# Patient Record
Sex: FEMALE | Race: ASIAN | NOT HISPANIC OR LATINO | URBAN - METROPOLITAN AREA
[De-identification: names, ages, dates, MRNs, and addresses within clinical notes are randomized per-mention and may not be internally consistent; named-entity substitution may affect disease eponyms.]

---

## 2023-05-01 VITALS
HEART RATE: 83 BPM | TEMPERATURE: 98 F | SYSTOLIC BLOOD PRESSURE: 104 MMHG | WEIGHT: 121.03 LBS | DIASTOLIC BLOOD PRESSURE: 60 MMHG | HEIGHT: 61 IN | OXYGEN SATURATION: 96 %

## 2023-05-01 RX ORDER — CHLORHEXIDINE GLUCONATE 213 G/1000ML
1 SOLUTION TOPICAL ONCE
Refills: 0 | Status: DISCONTINUED | OUTPATIENT
Start: 2023-05-05 | End: 2023-05-20

## 2023-05-01 NOTE — H&P ADULT - NSHPLABSRESULTS_GEN_ALL_CORE
12.1   10.30 )-----------( 298      ( 05 May 2023 13:02 )             36.5       05-05    136  |  100  |  16  ----------------------------<  144<H>  4.1   |  26  |  0.71    Ca    9.3      05 May 2023 13:02  Mg     2.2     05-05    TPro  7.5  /  Alb  4.4  /  TBili  0.2  /  DBili  x   /  AST  17  /  ALT  18  /  AlkPhos  52  05-05      PT/INR - ( 05 May 2023 13:02 )   PT: 11.5 sec;   INR: 0.97          PTT - ( 05 May 2023 13:02 )  PTT:31.4 sec    CARDIAC MARKERS ( 05 May 2023 13:02 )  x     / x     / 91 U/L / x     / 2.3 ng/mL            EKG: nonischemic

## 2023-05-01 NOTE — H&P ADULT - NSICDXPASTMEDICALHX_GEN_ALL_CORE_FT
PAST MEDICAL HISTORY:  ASD (atrial septal defect)     Hyperlipidemia     Hypertension     PVD (peripheral vascular disease)     Type 2 diabetes mellitus     VSD (ventricular septal defect)

## 2023-05-01 NOTE — H&P ADULT - ASSESSMENT
73 yo Cantonese speaking female with PMHx of HTN, Hyperlipidemia, DM Type II, PVD, Asthma, Pulmonary Nodules, VSD (5 mm in thickness involving inferior aspect of the interventricular septum on CCTA 4/26/23), ? ASD (TTE 3/7/23), who presents to cardiac cath in light of patient’s risk factors, CCS Angina Class III Symptoms and abnormal CCTA with significant stenosis LAD and RCA  with possible intervention if clinically indicated.      ASA III				Mallampati class: III    Sedation Plan:   [  ] None   [x  ] Moderate   [  ]  Deep    [  ]  General Anesthesia   Patient Is Suitable Candidate For Sedation?     [ x ] Yes   [  ] No   [  ] Not Applicable   Cath Order Entered: [x  ] Yes  DAPT LOAD Ordered: [  ] Yes  [x  ] No load 2/2 takes Aspirin 81mg daily, last dose this morning 5/5/2023. Pharmacy called to confirm meds and stated takes Plavix 75mg daily, however confirmed with pt and pt's daughter-in-law at bedside, she only takes Aspirin 81mg daily and does not take Plavix 75mg. H/H 12.1/36.5 stables and denies any bleeding.  Pre-Cath fluids Ordered: [ x ] Yes  EF 60% on echo, BUN/Cr 16/0.71, euvolemic on exam, start NS 250cc bolus x 1 follow by NS @ 75cc/hr x 2 hours  Pt refused Language Line  and wishes daughter-in-law at bedside to interpreted     Risks & benefits of procedure and sedation and risks and benefits for the alternative therapy have been explained to the patient and/or HCP in layman’s terms including but not limited to: allergic reaction, bleeding, infection, arrhythmia, respiratory compromise, renal and vascular compromise, limb damage, MI, CVA, emergent CABG/Vascular Surgery and death. Informed consent obtained and in chart.

## 2023-05-01 NOTE — H&P ADULT - TEMPERATURE IN CELSIUS (DEGREES C)
36.9 [Chest Pain] : chest pain [Palpitations] : no palpitations [Claudication] : no  leg claudication [Lower Ext Edema] : no lower extremity edema [Orthopena] : no orthopnea [Paroxysmal Nocturnal Dyspnea] : no paroxysmal nocturnal dyspnea [Headache] : headache [Dizziness] : no dizziness [Fainting] : no fainting [Confusion] : no confusion [Unsteady Walk] : no ataxia [Memory Loss] : no memory loss [Negative] : Psychiatric

## 2023-05-01 NOTE — H&P ADULT - HISTORY OF PRESENT ILLNESS
Cardiologist: Dr. Scott Maldonado   Pharmacy: [facility]   Escort:      Confirm meds     72 year old         speaking F with PMHx HTN, Hyperlipidemia, DM Type II, PVD, Asthma (? Intubations/hospitalizations) , Pulmonary Nodules, VSD (5 mm in thickness involving inferior aspect of the interventricular septum on CCTA 4/26/23), ? ASD (on echocardiogram 3/7/23), who presented to cardiologist c/o substernal exertional non-radiating intermittent pressure that is gradually worsening x few weeks. Symptoms occur when walking less than 2 blocks of climbing 1 flight of stairs (CCS Angina Class III Symptoms). Patient also reports dizziness that has been gradually worsening. Echocardiogram 3/7/23 revealed LVEF 60%, normal wall thickness and function, no significant valvular disease, left to right shunt noted across interatrial septum suggestive of ASD with Qp/Qs 1.2 ASD 0.6 cm, no evidence of a VSD, no pericardial effusion, no pulmonary HTN. CTA Coronaries 4/26/23 revealed total Agatston Score 1764 (greater than the 90th percentile for age, gender, and ethnicity), severe stenosis with calcified and non-calcified plaque in the mLAD; D1; and mRCA, calcified and non-calcified plaque causing moderate stenosis in pLAD; pRCA; and dRCA, LVEF 76%. In light of patient’s risk factors, CCS Angina Class III Symptoms and abnormal CCTA with significant stenosis LAD and RCA patient now presents for cardiac cath with probable PCI.      Cardiac Imaging   Prior Nuclear Stress Test 5/21/21 revealed small to moderate amount of infarction in the apical location post stress LVEF 60%, normal wall motion and myocardial thickening, negative stress EKG.   Cardiologist: Dr. Scott Maldonado   Pharmacy: [facility]   Escort: Daughter in law    73 yo Cantonese speaking female with PMHx of HTN, Hyperlipidemia, DM Type II, PVD, Asthma, Pulmonary Nodules, VSD (5 mm in thickness involving inferior aspect of the interventricular septum on CCTA 4/26/23), ? ASD (TTE 3/7/23), who presented to cardiologist c/o substernal exertional non-radiating intermittent pressure that is gradually worsening x few weeks. Symptoms occur when walking less than 2 blocks of climbing 1 flight of stairs. Also reports worsening dizziness. TTE 3/7/23 revealed LVEF 60%, normal wall thickness and function, no significant valvular disease, left to right shunt noted across interatrial septum suggestive of ASD with Qp/Qs 1.2 ASD 0.6 cm, no evidence of a VSD, no pericardial effusion, no pulmonary HTN. CCTA 4/26/23: Calcium score 1764, severe stenosis with calcified and non-calcified plaque in the mLAD; D1; and mRCA, calcified and non-calcified plaque causing moderate stenosis in pLAD; pRCA; and dRCA, LVEF 76%.    In light of patient’s risk factors, CCS Angina Class III Symptoms and abnormal CCTA with significant stenosis LAD and RCA patient now presents for cardiac cath with possible intervention if clinically indicated.

## 2023-05-05 ENCOUNTER — OUTPATIENT (OUTPATIENT)
Dept: OUTPATIENT SERVICES | Facility: HOSPITAL | Age: 73
LOS: 1 days | Discharge: HOME | End: 2023-05-05
Payer: MEDICARE

## 2023-05-05 LAB
A1C WITH ESTIMATED AVERAGE GLUCOSE RESULT: 8.3 % — HIGH (ref 4–5.6)
ALBUMIN SERPL ELPH-MCNC: 4 G/DL — SIGNIFICANT CHANGE UP (ref 3.3–5)
ALBUMIN SERPL ELPH-MCNC: 4.4 G/DL — SIGNIFICANT CHANGE UP (ref 3.3–5)
ALP SERPL-CCNC: 49 U/L — SIGNIFICANT CHANGE UP (ref 40–120)
ALP SERPL-CCNC: 52 U/L — SIGNIFICANT CHANGE UP (ref 40–120)
ALT FLD-CCNC: 18 U/L — SIGNIFICANT CHANGE UP (ref 10–45)
ALT FLD-CCNC: 18 U/L — SIGNIFICANT CHANGE UP (ref 10–45)
ANION GAP SERPL CALC-SCNC: 10 MMOL/L — SIGNIFICANT CHANGE UP (ref 5–17)
ANION GAP SERPL CALC-SCNC: 8 MMOL/L — SIGNIFICANT CHANGE UP (ref 5–17)
APTT BLD: 31.4 SEC — SIGNIFICANT CHANGE UP (ref 27.5–35.5)
AST SERPL-CCNC: 16 U/L — SIGNIFICANT CHANGE UP (ref 10–40)
AST SERPL-CCNC: 17 U/L — SIGNIFICANT CHANGE UP (ref 10–40)
BASOPHILS # BLD AUTO: 0.03 K/UL — SIGNIFICANT CHANGE UP (ref 0–0.2)
BASOPHILS NFR BLD AUTO: 0.3 % — SIGNIFICANT CHANGE UP (ref 0–2)
BILIRUB SERPL-MCNC: 0.2 MG/DL — SIGNIFICANT CHANGE UP (ref 0.2–1.2)
BILIRUB SERPL-MCNC: 0.6 MG/DL — SIGNIFICANT CHANGE UP (ref 0.2–1.2)
BUN SERPL-MCNC: 13 MG/DL — SIGNIFICANT CHANGE UP (ref 7–23)
BUN SERPL-MCNC: 16 MG/DL — SIGNIFICANT CHANGE UP (ref 7–23)
CALCIUM SERPL-MCNC: 9.1 MG/DL — SIGNIFICANT CHANGE UP (ref 8.4–10.5)
CALCIUM SERPL-MCNC: 9.3 MG/DL — SIGNIFICANT CHANGE UP (ref 8.4–10.5)
CHLORIDE SERPL-SCNC: 100 MMOL/L — SIGNIFICANT CHANGE UP (ref 96–108)
CHLORIDE SERPL-SCNC: 101 MMOL/L — SIGNIFICANT CHANGE UP (ref 96–108)
CHOLEST SERPL-MCNC: 119 MG/DL — SIGNIFICANT CHANGE UP
CK MB CFR SERPL CALC: 2.3 NG/ML — SIGNIFICANT CHANGE UP (ref 0–6.7)
CK SERPL-CCNC: 91 U/L — SIGNIFICANT CHANGE UP (ref 25–170)
CO2 SERPL-SCNC: 26 MMOL/L — SIGNIFICANT CHANGE UP (ref 22–31)
CO2 SERPL-SCNC: 26 MMOL/L — SIGNIFICANT CHANGE UP (ref 22–31)
CREAT SERPL-MCNC: 0.71 MG/DL — SIGNIFICANT CHANGE UP (ref 0.5–1.3)
CREAT SERPL-MCNC: 0.9 MG/DL — SIGNIFICANT CHANGE UP (ref 0.5–1.3)
EGFR: 68 ML/MIN/1.73M2 — SIGNIFICANT CHANGE UP
EGFR: 91 ML/MIN/1.73M2 — SIGNIFICANT CHANGE UP
EOSINOPHIL # BLD AUTO: 0.06 K/UL — SIGNIFICANT CHANGE UP (ref 0–0.5)
EOSINOPHIL NFR BLD AUTO: 0.6 % — SIGNIFICANT CHANGE UP (ref 0–6)
ESTIMATED AVERAGE GLUCOSE: 192 MG/DL — HIGH (ref 68–114)
GLUCOSE BLDC GLUCOMTR-MCNC: 132 MG/DL — HIGH (ref 70–99)
GLUCOSE BLDC GLUCOMTR-MCNC: 70 MG/DL — SIGNIFICANT CHANGE UP (ref 70–99)
GLUCOSE SERPL-MCNC: 144 MG/DL — HIGH (ref 70–99)
GLUCOSE SERPL-MCNC: 272 MG/DL — HIGH (ref 70–99)
HCT VFR BLD CALC: 34.6 % — SIGNIFICANT CHANGE UP (ref 34.5–45)
HCT VFR BLD CALC: 36.5 % — SIGNIFICANT CHANGE UP (ref 34.5–45)
HDLC SERPL-MCNC: 53 MG/DL — SIGNIFICANT CHANGE UP
HGB BLD-MCNC: 11.4 G/DL — LOW (ref 11.5–15.5)
HGB BLD-MCNC: 12.1 G/DL — SIGNIFICANT CHANGE UP (ref 11.5–15.5)
IMM GRANULOCYTES NFR BLD AUTO: 0.2 % — SIGNIFICANT CHANGE UP (ref 0–0.9)
INR BLD: 0.97 — SIGNIFICANT CHANGE UP (ref 0.88–1.16)
ISTAT ACTK (ACTIVATED CLOTTING TIME KAOLIN): 245 SEC — HIGH (ref 74–137)
LIPID PNL WITH DIRECT LDL SERPL: 53 MG/DL — SIGNIFICANT CHANGE UP
LYMPHOCYTES # BLD AUTO: 2.29 K/UL — SIGNIFICANT CHANGE UP (ref 1–3.3)
LYMPHOCYTES # BLD AUTO: 22.2 % — SIGNIFICANT CHANGE UP (ref 13–44)
MAGNESIUM SERPL-MCNC: 2 MG/DL — SIGNIFICANT CHANGE UP (ref 1.6–2.6)
MAGNESIUM SERPL-MCNC: 2.2 MG/DL — SIGNIFICANT CHANGE UP (ref 1.6–2.6)
MCHC RBC-ENTMCNC: 29.3 PG — SIGNIFICANT CHANGE UP (ref 27–34)
MCHC RBC-ENTMCNC: 29.4 PG — SIGNIFICANT CHANGE UP (ref 27–34)
MCHC RBC-ENTMCNC: 32.9 GM/DL — SIGNIFICANT CHANGE UP (ref 32–36)
MCHC RBC-ENTMCNC: 33.2 GM/DL — SIGNIFICANT CHANGE UP (ref 32–36)
MCV RBC AUTO: 88.6 FL — SIGNIFICANT CHANGE UP (ref 80–100)
MCV RBC AUTO: 88.9 FL — SIGNIFICANT CHANGE UP (ref 80–100)
MONOCYTES # BLD AUTO: 0.89 K/UL — SIGNIFICANT CHANGE UP (ref 0–0.9)
MONOCYTES NFR BLD AUTO: 8.6 % — SIGNIFICANT CHANGE UP (ref 2–14)
NEUTROPHILS # BLD AUTO: 7.01 K/UL — SIGNIFICANT CHANGE UP (ref 1.8–7.4)
NEUTROPHILS NFR BLD AUTO: 68.1 % — SIGNIFICANT CHANGE UP (ref 43–77)
NON HDL CHOLESTEROL: 66 MG/DL — SIGNIFICANT CHANGE UP
NRBC # BLD: 0 /100 WBCS — SIGNIFICANT CHANGE UP (ref 0–0)
NRBC # BLD: 0 /100 WBCS — SIGNIFICANT CHANGE UP (ref 0–0)
PLATELET # BLD AUTO: 282 K/UL — SIGNIFICANT CHANGE UP (ref 150–400)
PLATELET # BLD AUTO: 298 K/UL — SIGNIFICANT CHANGE UP (ref 150–400)
POTASSIUM SERPL-MCNC: 4.1 MMOL/L — SIGNIFICANT CHANGE UP (ref 3.5–5.3)
POTASSIUM SERPL-MCNC: 4.4 MMOL/L — SIGNIFICANT CHANGE UP (ref 3.5–5.3)
POTASSIUM SERPL-SCNC: 4.1 MMOL/L — SIGNIFICANT CHANGE UP (ref 3.5–5.3)
POTASSIUM SERPL-SCNC: 4.4 MMOL/L — SIGNIFICANT CHANGE UP (ref 3.5–5.3)
PROT SERPL-MCNC: 6.5 G/DL — SIGNIFICANT CHANGE UP (ref 6–8.3)
PROT SERPL-MCNC: 7.5 G/DL — SIGNIFICANT CHANGE UP (ref 6–8.3)
PROTHROM AB SERPL-ACNC: 11.5 SEC — SIGNIFICANT CHANGE UP (ref 10.5–13.4)
RBC # BLD: 3.89 M/UL — SIGNIFICANT CHANGE UP (ref 3.8–5.2)
RBC # BLD: 4.12 M/UL — SIGNIFICANT CHANGE UP (ref 3.8–5.2)
RBC # FLD: 12.5 % — SIGNIFICANT CHANGE UP (ref 10.3–14.5)
RBC # FLD: 12.6 % — SIGNIFICANT CHANGE UP (ref 10.3–14.5)
SODIUM SERPL-SCNC: 135 MMOL/L — SIGNIFICANT CHANGE UP (ref 135–145)
SODIUM SERPL-SCNC: 136 MMOL/L — SIGNIFICANT CHANGE UP (ref 135–145)
TRIGL SERPL-MCNC: 65 MG/DL — SIGNIFICANT CHANGE UP
WBC # BLD: 10.3 K/UL — SIGNIFICANT CHANGE UP (ref 3.8–10.5)
WBC # BLD: 9.29 K/UL — SIGNIFICANT CHANGE UP (ref 3.8–10.5)
WBC # FLD AUTO: 10.3 K/UL — SIGNIFICANT CHANGE UP (ref 3.8–10.5)
WBC # FLD AUTO: 9.29 K/UL — SIGNIFICANT CHANGE UP (ref 3.8–10.5)

## 2023-05-05 PROCEDURE — 92978 ENDOLUMINL IVUS OCT C 1ST: CPT | Mod: 26,LD

## 2023-05-05 PROCEDURE — C9600: CPT | Mod: LD

## 2023-05-05 PROCEDURE — 85610 PROTHROMBIN TIME: CPT

## 2023-05-05 PROCEDURE — 85027 COMPLETE CBC AUTOMATED: CPT

## 2023-05-05 PROCEDURE — C1725: CPT

## 2023-05-05 PROCEDURE — 92928 PRQ TCAT PLMT NTRAC ST 1 LES: CPT | Mod: LD

## 2023-05-05 PROCEDURE — C1887: CPT

## 2023-05-05 PROCEDURE — 93010 ELECTROCARDIOGRAM REPORT: CPT

## 2023-05-05 PROCEDURE — 85347 COAGULATION TIME ACTIVATED: CPT

## 2023-05-05 PROCEDURE — 80053 COMPREHEN METABOLIC PANEL: CPT

## 2023-05-05 PROCEDURE — 36415 COLL VENOUS BLD VENIPUNCTURE: CPT

## 2023-05-05 PROCEDURE — 82553 CREATINE MB FRACTION: CPT

## 2023-05-05 PROCEDURE — 92978 ENDOLUMINL IVUS OCT C 1ST: CPT | Mod: LD

## 2023-05-05 PROCEDURE — 99152 MOD SED SAME PHYS/QHP 5/>YRS: CPT

## 2023-05-05 PROCEDURE — C1769: CPT

## 2023-05-05 PROCEDURE — 80061 LIPID PANEL: CPT

## 2023-05-05 PROCEDURE — C1753: CPT

## 2023-05-05 PROCEDURE — 83735 ASSAY OF MAGNESIUM: CPT

## 2023-05-05 PROCEDURE — 99153 MOD SED SAME PHYS/QHP EA: CPT

## 2023-05-05 PROCEDURE — 85730 THROMBOPLASTIN TIME PARTIAL: CPT

## 2023-05-05 PROCEDURE — 82550 ASSAY OF CK (CPK): CPT

## 2023-05-05 PROCEDURE — 82962 GLUCOSE BLOOD TEST: CPT

## 2023-05-05 PROCEDURE — C1894: CPT

## 2023-05-05 PROCEDURE — 85025 COMPLETE CBC W/AUTO DIFF WBC: CPT

## 2023-05-05 PROCEDURE — C1874: CPT

## 2023-05-05 PROCEDURE — 93005 ELECTROCARDIOGRAM TRACING: CPT

## 2023-05-05 PROCEDURE — 83036 HEMOGLOBIN GLYCOSYLATED A1C: CPT

## 2023-05-05 RX ORDER — SODIUM CHLORIDE 9 MG/ML
1000 INJECTION, SOLUTION INTRAVENOUS
Refills: 0 | Status: DISCONTINUED | OUTPATIENT
Start: 2023-05-05 | End: 2023-05-20

## 2023-05-05 RX ORDER — DEXTROSE 50 % IN WATER 50 %
25 SYRINGE (ML) INTRAVENOUS ONCE
Refills: 0 | Status: DISCONTINUED | OUTPATIENT
Start: 2023-05-05 | End: 2023-05-20

## 2023-05-05 RX ORDER — GLUCAGON INJECTION, SOLUTION 0.5 MG/.1ML
1 INJECTION, SOLUTION SUBCUTANEOUS ONCE
Refills: 0 | Status: DISCONTINUED | OUTPATIENT
Start: 2023-05-05 | End: 2023-05-20

## 2023-05-05 RX ORDER — ASPIRIN/CALCIUM CARB/MAGNESIUM 324 MG
1 TABLET ORAL
Qty: 30 | Refills: 11
Start: 2023-05-05 | End: 2024-04-28

## 2023-05-05 RX ORDER — SODIUM CHLORIDE 9 MG/ML
500 INJECTION INTRAMUSCULAR; INTRAVENOUS; SUBCUTANEOUS
Refills: 0 | Status: DISCONTINUED | OUTPATIENT
Start: 2023-05-05 | End: 2023-05-20

## 2023-05-05 RX ORDER — SODIUM CHLORIDE 9 MG/ML
250 INJECTION INTRAMUSCULAR; INTRAVENOUS; SUBCUTANEOUS ONCE
Refills: 0 | Status: COMPLETED | OUTPATIENT
Start: 2023-05-05 | End: 2023-05-05

## 2023-05-05 RX ORDER — MONTELUKAST 4 MG/1
1 TABLET, CHEWABLE ORAL
Refills: 0 | DISCHARGE

## 2023-05-05 RX ORDER — NITROGLYCERIN 6.5 MG
1 CAPSULE, EXTENDED RELEASE ORAL
Refills: 0 | DISCHARGE

## 2023-05-05 RX ORDER — CLOPIDOGREL BISULFATE 75 MG/1
1 TABLET, FILM COATED ORAL
Qty: 30 | Refills: 11
Start: 2023-05-05 | End: 2024-04-28

## 2023-05-05 RX ORDER — ATORVASTATIN CALCIUM 80 MG/1
1 TABLET, FILM COATED ORAL
Qty: 30 | Refills: 3
Start: 2023-05-05 | End: 2023-09-01

## 2023-05-05 RX ORDER — BUDESONIDE AND FORMOTEROL FUMARATE DIHYDRATE 160; 4.5 UG/1; UG/1
2 AEROSOL RESPIRATORY (INHALATION)
Refills: 0 | DISCHARGE

## 2023-05-05 RX ORDER — DEXTROSE 50 % IN WATER 50 %
12.5 SYRINGE (ML) INTRAVENOUS ONCE
Refills: 0 | Status: DISCONTINUED | OUTPATIENT
Start: 2023-05-05 | End: 2023-05-20

## 2023-05-05 RX ORDER — DEXTROSE 50 % IN WATER 50 %
15 SYRINGE (ML) INTRAVENOUS ONCE
Refills: 0 | Status: DISCONTINUED | OUTPATIENT
Start: 2023-05-05 | End: 2023-05-20

## 2023-05-05 RX ORDER — ALENDRONATE SODIUM 70 MG/1
1 TABLET ORAL
Refills: 0 | DISCHARGE

## 2023-05-05 RX ORDER — CLOPIDOGREL BISULFATE 75 MG/1
600 TABLET, FILM COATED ORAL ONCE
Refills: 0 | Status: COMPLETED | OUTPATIENT
Start: 2023-05-05 | End: 2023-05-05

## 2023-05-05 RX ORDER — INSULIN LISPRO 100/ML
VIAL (ML) SUBCUTANEOUS ONCE
Refills: 0 | Status: DISCONTINUED | OUTPATIENT
Start: 2023-05-05 | End: 2023-05-20

## 2023-05-05 RX ADMIN — SODIUM CHLORIDE 150 MILLILITER(S): 9 INJECTION INTRAMUSCULAR; INTRAVENOUS; SUBCUTANEOUS at 18:07

## 2023-05-05 RX ADMIN — CLOPIDOGREL BISULFATE 600 MILLIGRAM(S): 75 TABLET, FILM COATED ORAL at 14:03

## 2023-05-05 RX ADMIN — SODIUM CHLORIDE 500 MILLILITER(S): 9 INJECTION INTRAMUSCULAR; INTRAVENOUS; SUBCUTANEOUS at 14:03

## 2023-05-05 RX ADMIN — SODIUM CHLORIDE 75 MILLILITER(S): 9 INJECTION INTRAMUSCULAR; INTRAVENOUS; SUBCUTANEOUS at 14:04

## 2023-05-05 NOTE — PROGRESS NOTE ADULT - SUBJECTIVE AND OBJECTIVE BOX
Interventional Cardiology PA Post PCI SDA Discharge Note    Patient without complaints. Ambulated and voided without difficulties    Afebrile, VSS    PRESCRIPTIONS/HOME MEDICATIONS:  aspirin 81 mg oral delayed release capsule: 1 tab(s) orally once a day  atorvastatin 20 mg oral tablet: 1 tab(s) orally once a day  glimepiride 2 mg oral tablet: 1 tab(s) orally once a day  Januvia 100 mg oral tablet: 1 tab(s) orally once a day  metFORMIN 1000 mg oral tablet: 1 tab(s) orally 2 times a day  Micardis 20 mg oral tablet: 1 tab(s) orally once a day  Plavix 75 mg oral tablet: 1 tab(s) orally once a day  Toprol-XL 25 mg oral tablet, extended release: 1 tab(s) orally once a day  Vascepa 1 g oral capsule: 2 cap(s) orally 2 times a day        CURRENT MEDICATIONS:   chlorhexidine 4% Liquid 1 Application(s) Topical once  sodium chloride 0.9%. 500 milliLiter(s) IV Continuous <Continuous>  sodium chloride 0.9%. 500 milliLiter(s) IV Continuous <Continuous>        Ext:  Right  Radial :  No  hematoma,no  bleeding, dressing; C/D/I      Pulses:    intact RAD  to baseline     A/P:  71 yo Cantonese speaking female with PMHx of HTN, Hyperlipidemia, DM Type II, PVD, Asthma, Pulmonary Nodules, VSD (5 mm in thickness involving inferior aspect of the interventricular septum on CCTA 4/26/23), ? ASD (TTE 3/7/23), who presented to cardiologist c/o substernal exertional non-radiating intermittent pressure that is gradually worsening x few weeks. Symptoms occur when walking less than 2 blocks of climbing 1 flight of stairs. Also reports worsening dizziness. TTE 3/7/23 revealed LVEF 60%, normal wall thickness and function, no significant valvular disease, left to right shunt noted across interatrial septum suggestive of ASD with Qp/Qs 1.2 ASD 0.6 cm, no evidence of a VSD, no pericardial effusion, no pulmonary HTN. CCTA 4/26/23: Calcium score 1764, severe stenosis with calcified and non-calcified plaque in the mLAD; D1; and mRCA, calcified and non-calcified plaque causing moderate stenosis in pLAD; pRCA; and dRCA, LVEF 76%.  In light of patient’s risk factors, CCS Angina Class III Symptoms and abnormal CCTA with significant stenosis LAD and RCA patient now presents for cardiac cath with possible intervention if clinically indicated.    Patient is now s/p cardiac cath 5/5/23: OCT-guided MARY x 1 pLAD (90%), m/lLAD: small vessel, LCx: small vessel, RCA: large dominant, mRCA: 60%, pRCA: 60%, dRCA: 80%, D1: 80%, no edp/ef measured. R TR at 7:30 PM.  Patient to return in 5-6 weeks for staged PCI of D1.     1.	  Follow-up with Cardiologist, Dr. Maldonado in 1-2 weeks.  2.       Post procedure labs/EKG reviewed and stable.    3.       Pt given instructions on importance of taking antiplatelet medication.    4. 	   Stable for discharge as per attending Dr. Dickson, after bed rest, pt voids, groin/wrist stable and 30 minutes of ambulation.  5.        Prescriptions for Aspirin/Plavix e-prescribed and submitted to patient's pharmacy Yes  6.        Patient will continue Statin - Atorvastatin 20 mg daily. Should increase to high intensity statin as outpatient.   7.       Patient will continue All Other Home Medications.  Patient should hold her Metformin until 5/7/23.   8.       Is patient a Current Smoker: No?  If yes, Patient was Counseled on importance of smoking cessation.   9.	   Discharge forms signed and copies in chart   10.       Discharge Order Entered Yes

## 2023-05-06 RX ORDER — CLOPIDOGREL BISULFATE 75 MG/1
1 TABLET, FILM COATED ORAL
Qty: 30 | Refills: 3
Start: 2023-05-06 | End: 2023-09-02

## 2023-05-16 DIAGNOSIS — I25.110 ATHEROSCLEROTIC HEART DISEASE OF NATIVE CORONARY ARTERY WITH UNSTABLE ANGINA PECTORIS: ICD-10-CM

## 2023-05-16 DIAGNOSIS — I25.84 CORONARY ATHEROSCLEROSIS DUE TO CALCIFIED CORONARY LESION: ICD-10-CM

## 2023-05-16 DIAGNOSIS — R94.39 ABNORMAL RESULT OF OTHER CARDIOVASCULAR FUNCTION STUDY: ICD-10-CM

## 2023-06-20 PROBLEM — I73.9 PERIPHERAL VASCULAR DISEASE, UNSPECIFIED: Chronic | Status: ACTIVE | Noted: 2023-05-01

## 2023-06-20 PROBLEM — I10 ESSENTIAL (PRIMARY) HYPERTENSION: Chronic | Status: ACTIVE | Noted: 2023-05-01

## 2023-06-20 PROBLEM — Q21.10 ATRIAL SEPTAL DEFECT, UNSPECIFIED: Chronic | Status: ACTIVE | Noted: 2023-05-01

## 2023-06-20 PROBLEM — E78.5 HYPERLIPIDEMIA, UNSPECIFIED: Chronic | Status: ACTIVE | Noted: 2023-05-01

## 2023-06-20 PROBLEM — E11.9 TYPE 2 DIABETES MELLITUS WITHOUT COMPLICATIONS: Chronic | Status: ACTIVE | Noted: 2023-05-01

## 2023-06-20 PROBLEM — Q21.0 VENTRICULAR SEPTAL DEFECT: Chronic | Status: ACTIVE | Noted: 2023-05-01

## 2023-06-30 VITALS
OXYGEN SATURATION: 96 % | RESPIRATION RATE: 17 BRPM | DIASTOLIC BLOOD PRESSURE: 88 MMHG | TEMPERATURE: 98 F | HEIGHT: 60 IN | SYSTOLIC BLOOD PRESSURE: 148 MMHG | HEART RATE: 79 BPM | WEIGHT: 126.99 LBS

## 2023-06-30 NOTE — H&P ADULT - HISTORY OF PRESENT ILLNESS
Cardiologist: Dr. Scott Maldonado   Pharmacy:   Escort:     71 yo Cantonese speaking female with PMHx of CAD (s/p MARY to pLAD 5/5/23, compliant with DAPT____), HTN, Hyperlipidemia, DM Type II, PVD, Asthma, Pulmonary Nodules, VSD (5 mm in thickness involving inferior aspect of the interventricular septum on CCTA 4/26/23), ? ASD (TTE 3/7/23), who presented to cardiologist c/o substernal exertional non-radiating intermittent pressure that is gradually worsening x few weeks. Symptoms occur when walking less than 2 blocks of climbing 1 flight of stairs. Also reports worsening dizziness. TTE 3/7/23 revealed LVEF 60%, normal wall thickness and function, no significant valvular disease, left to right shunt noted across interatrial septum suggestive of ASD with Qp/Qs 1.2 ASD 0.6 cm, no evidence of a VSD, no pericardial effusion, no pulmonary HTN. CCTA 4/26/23: Calcium score 1764, severe stenosis with calcified and non-calcified plaque in the mLAD; D1; and mRCA, calcified and non-calcified plaque causing moderate stenosis in pLAD; pRCA; and dRCA, LVEF 76%.    In light of patient’s risk factors, CCS Angina Class III Symptoms and abnormal CCTA with significant stenosis LAD and RCA patient now presents for cardiac cath with possible intervention if clinically indicated.      s/p cardiac cath 5/5/23: OCT-guided MARY x 1 pLAD (90%), m/lLAD: small vessel, LCx: small vessel, RCA: large dominant, mRCA: 60%, pRCA: 60%, dRCA: 80%, D1: 80%, no edp/ef measured. R TR at 7:30 PM.  Patient to return in 5-6 weeks for staged PCI of D1.    Cardiologist: Dr. Scott Maldonado   Pharmacy:   Escort:     71 yo Cantonese speaking female with PMHx of CAD (s/p MARY to pLAD 5/5/23, compliant with DAPT____), HTN, Hyperlipidemia, DM Type II, PVD, Asthma, Pulmonary Nodules, VSD (5 mm in thickness involving inferior aspect of the interventricular septum on CCTA 4/26/23), ? ASD (TTE 3/7/23), who presented to cardiologist c/o substernal exertional non-radiating intermittent pressure that is gradually worsening x few weeks. Symptoms occur when walking less than 2 blocks of climbing 1 flight of stairs. Also reports worsening dizziness. TTE 3/7/23 revealed LVEF 60%, normal wall thickness and function, no significant valvular disease, left to right shunt noted across interatrial septum suggestive of ASD with Qp/Qs 1.2 ASD 0.6 cm, no evidence of a VSD, no pericardial effusion, no pulmonary HTN. CCTA 4/26/23: Calcium score 1764, severe stenosis with calcified and non-calcified plaque in the mLAD; D1; and mRCA, calcified and non-calcified plaque causing moderate stenosis in pLAD; pRCA; and dRCA, LVEF 76%.    s/p cardiac cath 5/5/23: OCT-guided MARY x 1 pLAD (90%), m/lLAD: small vessel, LCx: small vessel, RCA: large dominant, mRCA: 60%, pRCA: 60%, dRCA: 80%, D1: 80%, no edp/ef measured. Plan for staged PCI of D1.      In light of patient’s risk factors, CCS Angina Class III Symptoms, abnormal CCTA, and known residual disease in D1, patient now presents for staged cardiac cath with possible intervention if clinically indicated.   Cardiologist: Dr. Scott Maldonado   Pharmacy:   Escort: Dwight (son)    71 yo Cantonese speaking female with PMHx of CAD (s/p MARY to pLAD 5/5/23, reports compliance with DAPT), HTN, DMII, DM Type II, PVD, Asthma, Pulmonary Nodules, VSD (5 mm in thickness involving inferior aspect of the interventricular septum on CCTA 4/26/23), ? ASD (TTE 3/7/23), who presented to cardiologist c/o substernal exertional non-radiating intermittent pressure that is gradually worsening x few weeks. Symptoms occur when walking less than 2 blocks of climbing 1 flight of stairs. Also reports worsening dizziness. TTE 3/7/23 revealed LVEF 60%, normal wall thickness and function, no significant valvular disease, left to right shunt noted across interatrial septum suggestive of ASD with Qp/Qs 1.2 ASD 0.6 cm, no evidence of a VSD, no pericardial effusion, no pulmonary HTN. CCTA 4/26/23: Calcium score 1764, severe stenosis with calcified and non-calcified plaque in the mLAD; D1; and mRCA, calcified and non-calcified plaque causing moderate stenosis in pLAD; pRCA; and dRCA, LVEF 76%.    s/p cardiac cath 5/5/23: OCT-guided MARY x 1 pLAD (90%), m/lLAD: small vessel, LCx: small vessel, RCA: large dominant, mRCA: 60%, pRCA: 60%, dRCA: 80%, D1: 80%, no edp/ef measured. Plan for staged PCI of D1.      Upon further clarification with fellow and review of images from 5/5/23, dRCA is more likely to be dRPL, 80% and small; as such, plan remains to only stage D1.    In light of patient’s risk factors, CCS Angina Class III Symptoms, abnormal CCTA, and known residual disease in D1, patient now presents for staged cardiac cath with possible intervention if clinically indicated.

## 2023-06-30 NOTE — H&P ADULT - ASSESSMENT
USED [employer] ID [id number] AND [id number]    73 yo Cantonese speaking female with PMHx of CAD (s/p MARY to pLAD 5/5/23, reports compliance with DAPT), HTN, DMII, DM Type II, PVD, Asthma, Pulmonary Nodules, VSD (5 mm in thickness involving inferior aspect of the interventricular septum on CCTA 4/26/23), ? ASD (TTE 3/7/23), who presented to cardiologist c/o substernal exertional non-radiating intermittent pressure that is gradually worsening x few weeks. Symptoms occur when walking less than 2 blocks of climbing 1 flight of stairs. Also reports worsening dizziness. TTE 3/7/23 revealed LVEF 60%, normal wall thickness and function, no significant valvular disease, left to right shunt noted across interatrial septum suggestive of ASD with Qp/Qs 1.2 ASD 0.6 cm, no evidence of a VSD, no pericardial effusion, no pulmonary HTN. CCTA 4/26/23: Calcium score 1764, severe stenosis with calcified and non-calcified plaque in the mLAD; D1; and mRCA, calcified and non-calcified plaque causing moderate stenosis in pLAD; pRCA; and dRCA, LVEF 76%. Patient s/p cardiac cath 5/5/23: OCT-guided MARY x 1 pLAD (90%), m/lLAD: small vessel, LCx: small vessel, RCA: large dominant, mRCA: 60%, pRCA: 60%, dRCA: 80%, D1: 80%, no edp/ef measured. Upon further clarification with fellow and review of images from 5/5/23, dRCA is more likely to be dRPL, 80% and small;In light of patient’s risk factors, CCS Angina Class III Symptoms, abnormal CCTA, and known residual disease in D1, patient now presents for staged cardiac cath with possible intervention if clinically indicated.        - ASA III Malampati III  - VSS  - Patient is a candidate for moderate sedation  - Labs reviewed by PA   - EKG - NSR 81 bpm.  - LOAD - no load given as patient complaint with DAPT and took aspirin and plavix this AM  - FLUIDS - NS 250ml bolus given x1; NS 75ml/hr given x 2hr     Risks & benefits of procedure and alternative therapy have been explained to the patient including but not limited to: allergic reaction, bleeding w/possible need for blood transfusion, infection, renal and vascular compromise, limb damage, arrhythmia, stroke, vessel dissection/perforation, Myocardial infarction, emergent CABG. Informed consent obtained and in chart.

## 2023-06-30 NOTE — H&P ADULT - NSHPLABSRESULTS_GEN_ALL_CORE
LABS:                          12.2   8.41  )-----------( 320      ( 07 Jul 2023 11:58 )             37.9     07-07    138  |  101  |  13  ----------------------------<  127<H>  4.4   |  30  |  0.56    Ca    9.8      07 Jul 2023 11:57  Mg     2.0     07-07    TPro  7.4  /  Alb  4.5  /  TBili  0.2  /  DBili  x   /  AST  15  /  ALT  18  /  AlkPhos  64  07-07    LIVER FUNCTIONS - ( 07 Jul 2023 11:57 )  Alb: 4.5 g/dL / Pro: 7.4 g/dL / ALK PHOS: 64 U/L / ALT: 18 U/L / AST: 15 U/L / GGT: x           PT/INR - ( 07 Jul 2023 11:58 )   PT: 11.2 sec;   INR: 0.94          PTT - ( 07 Jul 2023 11:58 )  PTT:30.5 sec  Urinalysis Basic - ( 07 Jul 2023 11:57 )    Color: x / Appearance: x / SG: x / pH: x  Gluc: 127 mg/dL / Ketone: x  / Bili: x / Urobili: x   Blood: x / Protein: x / Nitrite: x   Leuk Esterase: x / RBC: x / WBC x   Sq Epi: x / Non Sq Epi: x / Bacteria: x

## 2023-06-30 NOTE — H&P ADULT - NSICDXPASTMEDICALHX_GEN_ALL_CORE_FT
PAST MEDICAL HISTORY:  ASD (atrial septal defect)     CAD (coronary artery disease)     Hyperlipidemia     Hypertension     PVD (peripheral vascular disease)     Type 2 diabetes mellitus     VSD (ventricular septal defect)

## 2023-07-07 ENCOUNTER — OUTPATIENT (OUTPATIENT)
Dept: OUTPATIENT SERVICES | Facility: HOSPITAL | Age: 73
LOS: 1 days | Discharge: HOME | End: 2023-07-07
Payer: MEDICARE

## 2023-07-07 LAB
A1C WITH ESTIMATED AVERAGE GLUCOSE RESULT: 7.5 % — HIGH (ref 4–5.6)
ALBUMIN SERPL ELPH-MCNC: 4.5 G/DL — SIGNIFICANT CHANGE UP (ref 3.3–5)
ALP SERPL-CCNC: 64 U/L — SIGNIFICANT CHANGE UP (ref 40–120)
ALT FLD-CCNC: 18 U/L — SIGNIFICANT CHANGE UP (ref 10–45)
ANION GAP SERPL CALC-SCNC: 10 MMOL/L — SIGNIFICANT CHANGE UP (ref 5–17)
ANION GAP SERPL CALC-SCNC: 7 MMOL/L — SIGNIFICANT CHANGE UP (ref 5–17)
APTT BLD: 30.5 SEC — SIGNIFICANT CHANGE UP (ref 27.5–35.5)
AST SERPL-CCNC: 15 U/L — SIGNIFICANT CHANGE UP (ref 10–40)
BASOPHILS # BLD AUTO: 0.02 K/UL — SIGNIFICANT CHANGE UP (ref 0–0.2)
BASOPHILS NFR BLD AUTO: 0.2 % — SIGNIFICANT CHANGE UP (ref 0–2)
BILIRUB SERPL-MCNC: 0.2 MG/DL — SIGNIFICANT CHANGE UP (ref 0.2–1.2)
BUN SERPL-MCNC: 11 MG/DL — SIGNIFICANT CHANGE UP (ref 7–23)
BUN SERPL-MCNC: 13 MG/DL — SIGNIFICANT CHANGE UP (ref 7–23)
CALCIUM SERPL-MCNC: 9 MG/DL — SIGNIFICANT CHANGE UP (ref 8.4–10.5)
CALCIUM SERPL-MCNC: 9.8 MG/DL — SIGNIFICANT CHANGE UP (ref 8.4–10.5)
CHLORIDE SERPL-SCNC: 101 MMOL/L — SIGNIFICANT CHANGE UP (ref 96–108)
CHLORIDE SERPL-SCNC: 103 MMOL/L — SIGNIFICANT CHANGE UP (ref 96–108)
CHOLEST SERPL-MCNC: 177 MG/DL — SIGNIFICANT CHANGE UP
CK MB CFR SERPL CALC: 2 NG/ML — SIGNIFICANT CHANGE UP (ref 0–6.7)
CK SERPL-CCNC: 77 U/L — SIGNIFICANT CHANGE UP (ref 25–170)
CO2 SERPL-SCNC: 26 MMOL/L — SIGNIFICANT CHANGE UP (ref 22–31)
CO2 SERPL-SCNC: 30 MMOL/L — SIGNIFICANT CHANGE UP (ref 22–31)
CREAT SERPL-MCNC: 0.56 MG/DL — SIGNIFICANT CHANGE UP (ref 0.5–1.3)
CREAT SERPL-MCNC: 0.67 MG/DL — SIGNIFICANT CHANGE UP (ref 0.5–1.3)
EGFR: 93 ML/MIN/1.73M2 — SIGNIFICANT CHANGE UP
EGFR: 98 ML/MIN/1.73M2 — SIGNIFICANT CHANGE UP
EOSINOPHIL # BLD AUTO: 0.04 K/UL — SIGNIFICANT CHANGE UP (ref 0–0.5)
EOSINOPHIL NFR BLD AUTO: 0.5 % — SIGNIFICANT CHANGE UP (ref 0–6)
ESTIMATED AVERAGE GLUCOSE: 169 MG/DL — HIGH (ref 68–114)
GLUCOSE BLDC GLUCOMTR-MCNC: 121 MG/DL — HIGH (ref 70–99)
GLUCOSE BLDC GLUCOMTR-MCNC: 82 MG/DL — SIGNIFICANT CHANGE UP (ref 70–99)
GLUCOSE SERPL-MCNC: 127 MG/DL — HIGH (ref 70–99)
GLUCOSE SERPL-MCNC: 157 MG/DL — HIGH (ref 70–99)
HCT VFR BLD CALC: 33.5 % — LOW (ref 34.5–45)
HCT VFR BLD CALC: 37.9 % — SIGNIFICANT CHANGE UP (ref 34.5–45)
HDLC SERPL-MCNC: 52 MG/DL — SIGNIFICANT CHANGE UP
HGB BLD-MCNC: 10.8 G/DL — LOW (ref 11.5–15.5)
HGB BLD-MCNC: 12.2 G/DL — SIGNIFICANT CHANGE UP (ref 11.5–15.5)
IMM GRANULOCYTES NFR BLD AUTO: 0.6 % — SIGNIFICANT CHANGE UP (ref 0–0.9)
INR BLD: 0.94 — SIGNIFICANT CHANGE UP (ref 0.88–1.16)
ISTAT ACTK (ACTIVATED CLOTTING TIME KAOLIN): 293 SEC — HIGH (ref 74–137)
ISTAT ACTK (ACTIVATED CLOTTING TIME KAOLIN): 311 SEC — HIGH (ref 74–137)
LIPID PNL WITH DIRECT LDL SERPL: 99 MG/DL — SIGNIFICANT CHANGE UP
LYMPHOCYTES # BLD AUTO: 1.96 K/UL — SIGNIFICANT CHANGE UP (ref 1–3.3)
LYMPHOCYTES # BLD AUTO: 23.3 % — SIGNIFICANT CHANGE UP (ref 13–44)
MAGNESIUM SERPL-MCNC: 2 MG/DL — SIGNIFICANT CHANGE UP (ref 1.6–2.6)
MCHC RBC-ENTMCNC: 28.9 PG — SIGNIFICANT CHANGE UP (ref 27–34)
MCHC RBC-ENTMCNC: 29 PG — SIGNIFICANT CHANGE UP (ref 27–34)
MCHC RBC-ENTMCNC: 32.2 GM/DL — SIGNIFICANT CHANGE UP (ref 32–36)
MCHC RBC-ENTMCNC: 32.2 GM/DL — SIGNIFICANT CHANGE UP (ref 32–36)
MCV RBC AUTO: 89.6 FL — SIGNIFICANT CHANGE UP (ref 80–100)
MCV RBC AUTO: 90.2 FL — SIGNIFICANT CHANGE UP (ref 80–100)
MONOCYTES # BLD AUTO: 0.69 K/UL — SIGNIFICANT CHANGE UP (ref 0–0.9)
MONOCYTES NFR BLD AUTO: 8.2 % — SIGNIFICANT CHANGE UP (ref 2–14)
NEUTROPHILS # BLD AUTO: 5.65 K/UL — SIGNIFICANT CHANGE UP (ref 1.8–7.4)
NEUTROPHILS NFR BLD AUTO: 67.2 % — SIGNIFICANT CHANGE UP (ref 43–77)
NON HDL CHOLESTEROL: 125 MG/DL — SIGNIFICANT CHANGE UP
NRBC # BLD: 0 /100 WBCS — SIGNIFICANT CHANGE UP (ref 0–0)
NRBC # BLD: 0 /100 WBCS — SIGNIFICANT CHANGE UP (ref 0–0)
PLATELET # BLD AUTO: 276 K/UL — SIGNIFICANT CHANGE UP (ref 150–400)
PLATELET # BLD AUTO: 320 K/UL — SIGNIFICANT CHANGE UP (ref 150–400)
POTASSIUM SERPL-MCNC: 4 MMOL/L — SIGNIFICANT CHANGE UP (ref 3.5–5.3)
POTASSIUM SERPL-MCNC: 4.4 MMOL/L — SIGNIFICANT CHANGE UP (ref 3.5–5.3)
POTASSIUM SERPL-SCNC: 4 MMOL/L — SIGNIFICANT CHANGE UP (ref 3.5–5.3)
POTASSIUM SERPL-SCNC: 4.4 MMOL/L — SIGNIFICANT CHANGE UP (ref 3.5–5.3)
PROT SERPL-MCNC: 7.4 G/DL — SIGNIFICANT CHANGE UP (ref 6–8.3)
PROTHROM AB SERPL-ACNC: 11.2 SEC — SIGNIFICANT CHANGE UP (ref 10.5–13.4)
RBC # BLD: 3.74 M/UL — LOW (ref 3.8–5.2)
RBC # BLD: 4.2 M/UL — SIGNIFICANT CHANGE UP (ref 3.8–5.2)
RBC # FLD: 12.9 % — SIGNIFICANT CHANGE UP (ref 10.3–14.5)
RBC # FLD: 13 % — SIGNIFICANT CHANGE UP (ref 10.3–14.5)
SODIUM SERPL-SCNC: 138 MMOL/L — SIGNIFICANT CHANGE UP (ref 135–145)
SODIUM SERPL-SCNC: 139 MMOL/L — SIGNIFICANT CHANGE UP (ref 135–145)
TRIGL SERPL-MCNC: 130 MG/DL — SIGNIFICANT CHANGE UP
WBC # BLD: 10.87 K/UL — HIGH (ref 3.8–10.5)
WBC # BLD: 8.41 K/UL — SIGNIFICANT CHANGE UP (ref 3.8–10.5)
WBC # FLD AUTO: 10.87 K/UL — HIGH (ref 3.8–10.5)
WBC # FLD AUTO: 8.41 K/UL — SIGNIFICANT CHANGE UP (ref 3.8–10.5)

## 2023-07-07 PROCEDURE — 82962 GLUCOSE BLOOD TEST: CPT

## 2023-07-07 PROCEDURE — C1725: CPT

## 2023-07-07 PROCEDURE — C1887: CPT

## 2023-07-07 PROCEDURE — 83735 ASSAY OF MAGNESIUM: CPT

## 2023-07-07 PROCEDURE — 85027 COMPLETE CBC AUTOMATED: CPT

## 2023-07-07 PROCEDURE — 36415 COLL VENOUS BLD VENIPUNCTURE: CPT

## 2023-07-07 PROCEDURE — 99152 MOD SED SAME PHYS/QHP 5/>YRS: CPT

## 2023-07-07 PROCEDURE — 82550 ASSAY OF CK (CPK): CPT

## 2023-07-07 PROCEDURE — 85347 COAGULATION TIME ACTIVATED: CPT

## 2023-07-07 PROCEDURE — 93010 ELECTROCARDIOGRAM REPORT: CPT

## 2023-07-07 PROCEDURE — 85025 COMPLETE CBC W/AUTO DIFF WBC: CPT

## 2023-07-07 PROCEDURE — 99153 MOD SED SAME PHYS/QHP EA: CPT

## 2023-07-07 PROCEDURE — 80053 COMPREHEN METABOLIC PANEL: CPT

## 2023-07-07 PROCEDURE — 85730 THROMBOPLASTIN TIME PARTIAL: CPT

## 2023-07-07 PROCEDURE — C1753: CPT

## 2023-07-07 PROCEDURE — 82553 CREATINE MB FRACTION: CPT

## 2023-07-07 PROCEDURE — 80048 BASIC METABOLIC PNL TOTAL CA: CPT

## 2023-07-07 PROCEDURE — 85610 PROTHROMBIN TIME: CPT

## 2023-07-07 PROCEDURE — C1894: CPT

## 2023-07-07 PROCEDURE — 80061 LIPID PANEL: CPT

## 2023-07-07 PROCEDURE — C1769: CPT

## 2023-07-07 PROCEDURE — C1874: CPT

## 2023-07-07 PROCEDURE — 92978 ENDOLUMINL IVUS OCT C 1ST: CPT | Mod: LD

## 2023-07-07 PROCEDURE — 83036 HEMOGLOBIN GLYCOSYLATED A1C: CPT

## 2023-07-07 PROCEDURE — 92928 PRQ TCAT PLMT NTRAC ST 1 LES: CPT | Mod: LD

## 2023-07-07 PROCEDURE — 93005 ELECTROCARDIOGRAM TRACING: CPT

## 2023-07-07 PROCEDURE — C9600: CPT | Mod: LD

## 2023-07-07 PROCEDURE — 92978 ENDOLUMINL IVUS OCT C 1ST: CPT | Mod: 26,LD

## 2023-07-07 RX ORDER — ASPIRIN/CALCIUM CARB/MAGNESIUM 324 MG
1 TABLET ORAL
Qty: 30 | Refills: 11
Start: 2023-07-07 | End: 2024-06-30

## 2023-07-07 RX ORDER — CLOPIDOGREL BISULFATE 75 MG/1
1 TABLET, FILM COATED ORAL
Refills: 0 | DISCHARGE

## 2023-07-07 RX ORDER — SODIUM CHLORIDE 9 MG/ML
500 INJECTION INTRAMUSCULAR; INTRAVENOUS; SUBCUTANEOUS
Refills: 0 | Status: DISCONTINUED | OUTPATIENT
Start: 2023-07-07 | End: 2023-07-21

## 2023-07-07 RX ORDER — TELMISARTAN 20 MG/1
1 TABLET ORAL
Refills: 0 | DISCHARGE

## 2023-07-07 RX ORDER — METFORMIN HYDROCHLORIDE 850 MG/1
1 TABLET ORAL
Refills: 0 | DISCHARGE

## 2023-07-07 RX ORDER — ASPIRIN/CALCIUM CARB/MAGNESIUM 324 MG
1 TABLET ORAL
Refills: 0 | DISCHARGE

## 2023-07-07 RX ORDER — ICOSAPENT ETHYL 500 MG/1
2 CAPSULE, LIQUID FILLED ORAL
Refills: 0 | DISCHARGE

## 2023-07-07 RX ORDER — GLIMEPIRIDE 1 MG
1 TABLET ORAL
Refills: 0 | DISCHARGE

## 2023-07-07 RX ORDER — METOPROLOL TARTRATE 50 MG
1 TABLET ORAL
Refills: 0 | DISCHARGE

## 2023-07-07 RX ORDER — SODIUM CHLORIDE 9 MG/ML
250 INJECTION INTRAMUSCULAR; INTRAVENOUS; SUBCUTANEOUS ONCE
Refills: 0 | Status: COMPLETED | OUTPATIENT
Start: 2023-07-07 | End: 2023-07-07

## 2023-07-07 RX ORDER — SITAGLIPTIN 50 MG/1
1 TABLET, FILM COATED ORAL
Refills: 0 | DISCHARGE

## 2023-07-07 RX ORDER — ATORVASTATIN CALCIUM 80 MG/1
1 TABLET, FILM COATED ORAL
Refills: 0 | DISCHARGE

## 2023-07-07 RX ORDER — CLOPIDOGREL BISULFATE 75 MG/1
1 TABLET, FILM COATED ORAL
Qty: 30 | Refills: 11
Start: 2023-07-07 | End: 2024-06-30

## 2023-07-07 RX ORDER — SODIUM CHLORIDE 9 MG/ML
1000 INJECTION INTRAMUSCULAR; INTRAVENOUS; SUBCUTANEOUS
Refills: 0 | Status: COMPLETED | OUTPATIENT
Start: 2023-07-07 | End: 2023-07-07

## 2023-07-07 RX ORDER — CHLORHEXIDINE GLUCONATE 213 G/1000ML
1 SOLUTION TOPICAL ONCE
Refills: 0 | Status: DISCONTINUED | OUTPATIENT
Start: 2023-07-07 | End: 2023-07-21

## 2023-07-07 RX ADMIN — SODIUM CHLORIDE 1000 MILLILITER(S): 9 INJECTION INTRAMUSCULAR; INTRAVENOUS; SUBCUTANEOUS at 12:57

## 2023-07-07 RX ADMIN — SODIUM CHLORIDE 75 MILLILITER(S): 9 INJECTION INTRAMUSCULAR; INTRAVENOUS; SUBCUTANEOUS at 15:41

## 2023-07-07 RX ADMIN — SODIUM CHLORIDE 75 MILLILITER(S): 9 INJECTION INTRAMUSCULAR; INTRAVENOUS; SUBCUTANEOUS at 12:57

## 2023-07-07 NOTE — PROGRESS NOTE ADULT - SUBJECTIVE AND OBJECTIVE BOX
Interventional Cardiology PA Post PCI SDA Discharge Note    Patient without complaints. Ambulated and voided without difficulties  Afebrile, VSS  Ext:  Right Radial :  no  hematoma,  no   bleeding, dressing; C/D/I  Pulses:    intact RAD to baseline     A/P:    s/p cardiac cath (7/7/23): PTCA/MARY x2 D1; LM patent w/ mild disease, LAD patent previous stent, LCx minor disease (small vessel).   ACCESS: R radial artery  IVF: NS 75cc/hr x2hrs (patient received NS 250cc IV bolus at end of case; deviation from IVF protocol per attending request).     1. Follow-up with PMD/Cardiologist Dr. Maldonado in 72 hours.  2. Post procedure labs/EKG reviewed and stable.    3. Pt given instructions on importance of taking antiplatelet medication.    4. Stable for discharge as per attending Dr. Dickson after bed rest, pt voids, groin/wrist stable and 30 minutes of ambulation.  5. Prescriptions for Aspirin/Plavix e-prescribed adn submitted to patient's pharmacy.    6. Patient will continue statin Atorvastatin 20mg PO QHS (uptitration to high-intensity to be done by outpatient team.   7. Discharge forms signed and copies in chart

## 2023-07-11 DIAGNOSIS — I25.110 ATHEROSCLEROTIC HEART DISEASE OF NATIVE CORONARY ARTERY WITH UNSTABLE ANGINA PECTORIS: ICD-10-CM

## 2023-07-11 DIAGNOSIS — I25.84 CORONARY ATHEROSCLEROSIS DUE TO CALCIFIED CORONARY LESION: ICD-10-CM

## 2023-07-11 DIAGNOSIS — R93.1 ABNORMAL FINDINGS ON DIAGNOSTIC IMAGING OF HEART AND CORONARY CIRCULATION: ICD-10-CM
